# Patient Record
Sex: FEMALE | Race: WHITE | NOT HISPANIC OR LATINO | URBAN - METROPOLITAN AREA
[De-identification: names, ages, dates, MRNs, and addresses within clinical notes are randomized per-mention and may not be internally consistent; named-entity substitution may affect disease eponyms.]

---

## 2018-07-27 ENCOUNTER — NON-PROVIDER VISIT (OUTPATIENT)
Dept: HEALTH INFORMATION MANAGEMENT | Facility: MEDICAL CENTER | Age: 33
End: 2018-07-27
Payer: OTHER MISCELLANEOUS

## 2018-07-27 VITALS
HEIGHT: 63 IN | SYSTOLIC BLOOD PRESSURE: 98 MMHG | WEIGHT: 129 LBS | DIASTOLIC BLOOD PRESSURE: 58 MMHG | BODY MASS INDEX: 22.86 KG/M2

## 2018-07-27 DIAGNOSIS — O24.419 GESTATIONAL DIABETES MELLITUS (GDM) IN THIRD TRIMESTER, GESTATIONAL DIABETES METHOD OF CONTROL UNSPECIFIED: ICD-10-CM

## 2018-07-27 PROCEDURE — G0109 DIAB MANAGE TRN IND/GROUP: HCPCS | Performed by: INTERNAL MEDICINE

## 2018-07-27 NOTE — LETTER
July 27, 2018                   Re: Misti Ruiz     1985         [id number]       René Zimmerman M.D.  [address]  [address]  [place] [address]      Dear :René Zimmerman M.D.    On 7/27/2018, your patient Misti Ruiz, received 1.4 hours of diabetes education from the [facility] at [facility] for management of her gestational diabetes.  Her EDC is 10/1/18. We taught the following subjects:    Introduction to gestational diabetes, benefits and responsibilities of patient, physiology of diabetes and the diease process, benefits of blood glucose monitoring and record keeping, medication action and possible side effects, hypoglycemia, sick day management, exercise, stress reduction and travel with diabetes.       Nurse assessment / Education:    Comments:    BP:Blood Pressure: (!) 98/58   Edema: No     Weight:Weight: 58.5 kg (129 lb)         Complaints:No    Pathophysiology of diabetes in pregnancy    Discuss  potential maternal and fetal complications in pregnancy with diabetes.     Importance of blood glucose monitoring   Proper testing technique using an Accucheck Guide meter.    At 1430, the meter read 76, which was 3.5 hours after eating.  Testing: fasting and one hour after meals,  expected ranges and rationale for strict control.   Urine ketone testing and rationale    Ketone testing:  Per OB orders    Ketone test today:No       Recognition and treatment of hypoglycemia.     Insulin taught: No  Insulin briefly dicussed at this time.    Should patient require insulin later in pregnancy, she would need further education.     Reviewed fetal kick counts and other tests to determine fetal well-being  Discuss benefits and risks of exercise in pregnancy  Discuss when to call Doctor  Discuss sick day care  Importance of wearing diabetes identification    Misti attended Gestational Diabetes class with her sister, who also was translating for her as she understands some English but need help with the  rest ( Danish). Pt had a reli-on meter but was offered and chose an accucheck guide meter. Pt was taught to use, meter changed to Danish.Return demo done with finger stick of 76, 3.5 hours pp. Pt will need a prescription for Accucheck guide test strips and fastclix lancets to test four times a day sent to [facility] [address], so she can access the coupon to lower cost of strips. CDE attempted to call OB but office was closed. Pt not actively walking. Discussed what she needs to do after delivery for herself and family to limit risk for type two diabetes.    Patient/caregiver appeared to understand the content as demonstrated by appropriate questions.     Misti Ruiz was encouraged to discuss this further with you.    Hopefully this will help in your management of her care.  If we can be of further assistance, please feel free to call.    Thank you for the referral.    Sincerely,    Janki Jorgensen RN CDE  Certified Diabetes Educator

## 2018-07-27 NOTE — LETTER
July 30, 2018                   Re: Misti Ruiz     1985         [id number]       René Zimmerman M.D.  [address]  [address]  [place] [address]      Dear :René Zimmerman M.D.    On 7/30/2018, your patient Misti Ruiz, received 2.5 hours of training from the Diabetes Center at [facility] for management of her gestational diabetes.  Her EDC is Estimated Date of Delivery: None noted..  We taught the following subjects:    Patient provided 1800 calorie meal plan (30kcal/kg) with 3 meals and 3 snacks.  ~180 grams carbohydrate per day   Importance of meal planning in diabetes management during pregnancy  Importance of consistent timing of meals and snacks and agreed upon times  Avoidance of simple carbohydrates  Metabolism of food components relating to pregnancy  Identification of foods in food groups  Patient demonstrates adequate ability to utilize meal planning manual for reference  Plan 3 meals and 3 snacks with 90% accuracy  Review basic principles of eating out  Reviewed precautions with artificial sweeteners  Comments:  Misti agreed to follow the meal plan and to eat at the times agreed upon.  She will check blood glucose 4 times a day and record the values in her log book.      Misti Ruiz was encouraged to discuss this further with you.    Hopefully this will help in your management of her care.  If we can be of further assistance, please feel free to call.    Thank you for the referral.    Sincerely,    Maylin Fagan, DIXIE, LD, CDE  [phone]

## 2018-07-28 NOTE — PROGRESS NOTES
Misti attended Gestational Diabetes class with her sister, who also was translating for her as she understands some English but need help with the rest ( Russian). Pt had a reli-on meter but was offered and chose an accucheck guide meter. Pt was taught to use, meter changed to Italian. Return demo done with finger stick of 76, 3.5 hours pp. Pt will need a prescription for Accucheck guide test strips and fastclix lancets to test four times a day sent to [facility] [address], so she can access the coupon to lower cost of strips. CDE attempted to call OB but office was closed. Pt not actively walking. Discussed what she needs to do after delivery for herself and family to limit risk for type two diabetes.

## 2018-07-30 NOTE — PROGRESS NOTES
Misti received a 1800 calorie meal plan (30kcal/kg) consisting of 3 meals and 3 snacks (see media for copy of meal plan). Pt was educated on carbohydrate containing foods vs non carbohydrate containing foods, the importance of small frequent meals, limiting carbohydrate to 1 serving in the morning, no fruit before noon/12pm, and avoiding all concentrated sweets for the remainder of her pregnancy. Explained the importance of not going >4hrs btwn eating during the day and no longer than 10hours overnight. Patient agrees to follow the meal plan and guidelines provided.

## 2021-02-28 ENCOUNTER — APPOINTMENT (OUTPATIENT)
Dept: RADIOLOGY | Facility: IMAGING CENTER | Age: 36
End: 2021-02-28
Attending: NURSE PRACTITIONER
Payer: COMMERCIAL

## 2021-02-28 ENCOUNTER — OFFICE VISIT (OUTPATIENT)
Dept: URGENT CARE | Facility: PHYSICIAN GROUP | Age: 36
End: 2021-02-28
Payer: COMMERCIAL

## 2021-02-28 VITALS
HEIGHT: 62 IN | HEART RATE: 94 BPM | TEMPERATURE: 98.2 F | BODY MASS INDEX: 20.24 KG/M2 | RESPIRATION RATE: 18 BRPM | OXYGEN SATURATION: 98 % | WEIGHT: 110 LBS | DIASTOLIC BLOOD PRESSURE: 68 MMHG | SYSTOLIC BLOOD PRESSURE: 102 MMHG

## 2021-02-28 DIAGNOSIS — M79.674 TOE PAIN, RIGHT: ICD-10-CM

## 2021-02-28 DIAGNOSIS — S92.514A CLOSED NONDISPLACED FRACTURE OF PROXIMAL PHALANX OF LESSER TOE OF RIGHT FOOT, INITIAL ENCOUNTER: ICD-10-CM

## 2021-02-28 DIAGNOSIS — S99.921A INJURY OF RIGHT TOE, INITIAL ENCOUNTER: ICD-10-CM

## 2021-02-28 PROCEDURE — 73660 X-RAY EXAM OF TOE(S): CPT | Mod: TC,RT | Performed by: NURSE PRACTITIONER

## 2021-02-28 PROCEDURE — 99204 OFFICE O/P NEW MOD 45 MIN: CPT | Performed by: NURSE PRACTITIONER

## 2021-02-28 RX ORDER — IBUPROFEN 800 MG/1
800 TABLET ORAL EVERY 8 HOURS PRN
Qty: 30 TABLET | Refills: 0 | Status: SHIPPED | OUTPATIENT
Start: 2021-02-28

## 2021-02-28 RX ORDER — IBUPROFEN 200 MG
200 TABLET ORAL EVERY 6 HOURS PRN
COMMUNITY

## 2021-02-28 ASSESSMENT — ENCOUNTER SYMPTOMS
NEUROLOGICAL NEGATIVE: 1
TINGLING: 0
CONSTITUTIONAL NEGATIVE: 1
SENSORY CHANGE: 0
WEAKNESS: 0

## 2021-02-28 ASSESSMENT — VISUAL ACUITY: OU: 1

## 2021-02-28 NOTE — PROGRESS NOTES
"Subjective:     Misti Ruiz is a 35 y.o. female who presents for Toe Injury (R fourth  toe injury , painful, swelling, x2 days )       Toe Injury  This is a new problem. Pertinent negatives include no rash or weakness. She has tried NSAIDs for the symptoms.     Patient accompanied by spouse. History collected from spouse.    Reports that patient stubbed her right 4th toe Friday night. Patient reporting pain, tenderness, and swelling at the base of her right 4th toe.    Patient was screened prior to rooming and denied COVID-19 diagnosis or contact with a person who has been diagnosed or is suspected to have COVID-19. During this visit, appropriate PPE was worn, hand hygiene was performed, and the patient and any visitors were masked.     PMH:  has no past medical history on file.    MEDS:   Current Outpatient Medications:   •  ibuprofen (MOTRIN) 200 MG Tab, Take 200 mg by mouth every 6 hours as needed., Disp: , Rfl:   •  ibuprofen (MOTRIN) 800 MG Tab, Take 1 tablet by mouth every 8 hours as needed for Moderate Pain, Fever or Inflammation., Disp: 30 tablet, Rfl: 0    ALLERGIES: No Known Allergies    SURGHX: History reviewed. No pertinent surgical history.    SOCHX:  reports that she has never smoked. She has never used smokeless tobacco. She reports previous alcohol use. She reports that she does not use drugs.     FH: Reviewed with patient, not pertinent to this visit.    Review of Systems   Constitutional: Negative.    Musculoskeletal:        R 4th toe injury, pain, tenderness   Skin: Negative.  Negative for rash.   Neurological: Negative.  Negative for tingling, sensory change and weakness.   All other systems reviewed and are negative.    Additional details per HPI.      Objective:     /68 (BP Location: Right arm, Patient Position: Sitting, BP Cuff Size: Adult)   Pulse 94   Temp 36.8 °C (98.2 °F) (Temporal)   Resp 18   Ht 1.575 m (5' 2\")   Wt 49.9 kg (110 lb)   SpO2 98%   BMI 20.12 kg/m²     Physical " Exam  Vitals reviewed.   Constitutional:       General: She is not in acute distress.     Appearance: She is well-developed. She is not ill-appearing or toxic-appearing.   HENT:      Head: Normocephalic.      Right Ear: External ear normal.      Left Ear: External ear normal.   Eyes:      General: Vision grossly intact.      Extraocular Movements: Extraocular movements intact.      Conjunctiva/sclera: Conjunctivae normal.   Cardiovascular:      Rate and Rhythm: Normal rate.      Pulses: Normal pulses.   Pulmonary:      Effort: Pulmonary effort is normal. No respiratory distress.   Musculoskeletal:         General: No deformity. Normal range of motion.      Cervical back: Normal range of motion.      Right foot: Normal range of motion and normal capillary refill. Swelling and tenderness present. No deformity or laceration.        Feet:    Skin:     General: Skin is warm and dry.      Capillary Refill: Capillary refill takes less than 2 seconds.      Coloration: Skin is not pale.      Findings: No rash.   Neurological:      Mental Status: She is alert and oriented to person, place, and time.      Sensory: No sensory deficit.      Motor: No weakness.      Coordination: Coordination normal.   Psychiatric:         Behavior: Behavior normal. Behavior is cooperative.     X-ray of right toes:    Details    Reading Physician Reading Date Result Priority   Briana Garg M.D.  [phone] 2/28/2021 Ur[facility]      Narrative & Impression        2/28/2021 3:25 PM     HISTORY/REASON FOR EXAM:  Right 4th toe pain and swelling after stubbing injury 2 days ago..        TECHNIQUE/EXAM DESCRIPTION AND NUMBER OF VIEWS:  3 views of the RIGHT toes.     COMPARISON: None     FINDINGS:  There is an oblique nondisplaced fracture of the right 4th proximal phalanx. There is no intra-articular involvement.     Remainder of the right foot is unremarkable.     IMPRESSION:     1.  There is a nondisplaced oblique right 4th proximal phalanx  fracture.             Last Resulted: 02/28/21  4:02 PM        Radiology report and images reviewed by myself. Concur with findings.      Assessment/Plan:     1. Closed nondisplaced fracture of proximal phalanx of lesser toe of right foot, initial encounter  - REFERRAL TO PODIATRY    2. Injury of right toe, initial encounter  - DX-TOE(S) 2+ RIGHT; Future  - REFERRAL TO PODIATRY    3. Toe pain, right  - ibuprofen (MOTRIN) 800 MG Tab; Take 1 tablet by mouth every 8 hours as needed for Moderate Pain, Fever or Inflammation.  Dispense: 30 tablet; Refill: 0    R 3rd and 4th toes buddy taped together. Short walking boot with rigid sole applied.    Rest, ice, compression, and elevation (RICE) and over-the-counter ibuprofen and/or acetaminophen, per 's instructions, as needed for pain.    Urgent referral placed for podiatry evaluation and treatment.    Differential diagnosis, natural history, supportive care, over-the-counter symptom management per 's instructions, close monitoring, and indications for immediate follow-up discussed.     Patient and spouse advised to: Return for 1) Symptoms that worsen/don't improve, or go to ER, 2) Follow up with primary care in 7-10 days.    All questions answered. Patient and spouse agree with the plan of care.    Discharge summary provided.

## 2021-02-28 NOTE — PATIENT INSTRUCTIONS
Toe Fracture  A toe fracture is a break in one of the toe bones (phalanges). A toe fracture may be:  · A crack in the surface of the bone (stress fracture). This often occurs in athletes.  · A break all the way through the bone (complete fracture).  What are the causes?  This condition may be caused by:  · Direct impact, such as from dropping a heavy object on your toe.  · Stubbing your toe.  · Twisting or stretching your toe out of place.  · Overuse or repetitive exercise.  What increases the risk?  You are more likely to develop this condition if you:  · Play contact sports.  · Have a condition that causes the bones to become thin and brittle (osteoporosis).  · Have a low calcium level.  What are the signs or symptoms?  The main symptoms of this condition are swelling and pain in the toe. Other symptoms include:  · Bruising.  · Stiffness.  · Numbness.  · A change in the way the toe looks.  · Broken bones that poke through the skin.  · Blood beneath the toenail.  How is this diagnosed?  This condition is diagnosed with a physical exam. You may also have X-rays.  How is this treated?  Treatment for this condition depends on the type of fracture and its severity. Treatment may include:  · Taping the broken toe to a toe that is next to it (vangie taping). This is the most common treatment for fractures in which the bone has not moved out of place (non-displaced fracture).  · Wearing a shoe that has a wide, rigid sole to protect the toe and to limit its movement.  · Wearing a walking cast.  · Having a procedure to move the toe back into place.  · Surgery. This may be needed if the:  ? Pieces of broken bone are out of place (displaced).  ? Bone breaks through the skin.  · Physical therapy. This is done to help regain movement and strength in the toe.  You may need follow-up X-rays to make sure that the bone is healing well and staying in position.  Follow these instructions at home:  If you have a shoe:  · Wear the shoe  as told by your health care provider. Remove it only as told by your health care provider.  · Loosen the shoe if your toes tingle, become numb, or turn cold and blue.  · Keep the shoe clean and dry.  If you have a cast:  · Do not put pressure on any part of the cast until it is fully hardened. This may take several hours.  · Do not stick anything inside the cast to scratch your skin. Doing that increases your risk of infection.  · Check the skin around the cast every day. Tell your health care provider about any concerns.  · You may put lotion on dry skin around the edges of the cast. Do not put lotion on the skin underneath the cast.  · Keep the cast clean and dry.  Bathing  · Do not take baths, swim, or use a hot tub until your health care provider approves. Ask your health care provider if you can take showers.  · If the shoe or cast is not waterproof:  ? Do not let it get wet.  ? Cover it with a watertight covering when you take a bath or a shower.  Activity  · Do not use the injured foot to support your body weight until your health care provider says that you can. Use crutches as directed.  · Ask your health care provider:  ? What activities are safe for you during recovery.  ? What activities you need to avoid.  · Do physical therapy exercises as directed.  Driving  · Do not drive or use heavy machinery while taking pain medicine.  · Do not drive while wearing a cast on a foot that you use for driving.  Managing pain, stiffness, and swelling    · If directed, put ice on painful areas:  ? Put ice in a plastic bag.  ? Place a towel between your skin and the bag.  § If you have a shoe, remove it as told by your health care provider.  § If you have a cast, place a towel between your cast and the bag.  ? Leave the ice on for 20 minutes, 2-3 times per day.  · Raise (elevate) the injured area above the level of your heart while you are sitting or lying down.  General instructions  · If your toe was treated with  vangie taping, follow your health care provider's instructions for changing the gauze and tape. Change it more often if:  ? The gauze and tape get wet. If this happens, dry the space between the toes.  ? The gauze and tape are too tight and cause your toe to become pale or numb.  · If you were not given a protective shoe, wear sturdy, supportive shoes. Your shoes should not pinch your toes and should not fit tightly against your toes.  · Do not use any products that contain nicotine or tobacco, such as cigarettes and e-cigarettes. These can delay bone healing. If you need help quitting, ask your health care provider.  · Take over-the-counter and prescription medicines only as told by your health care provider.  · Keep all follow-up visits as told by your health care provider. This is important.  Contact a health care provider if you have:  · Pain that gets worse or does not get better with medicine.  · A fever.  · A bad smell coming from your cast.  Get help right away if you have:  · Any of the following in your toes or your foot:  ? Numbness that gets worse.  ? Tingling.  ? Coldness.  ? Blue skin.  · Redness or swelling that gets worse.  · Pain that suddenly becomes severe.  Summary  · A toe fracture is a break in one of the toe bones (phalanges).  · Treatment depends on how severe your fracture is and how the pieces of the broken bone line up with each other. Treatment may include vangie taping, wearing a shoe or a cast, or using crutches.  · Ice and elevate your foot to help lessen the pain and swelling.  This information is not intended to replace advice given to you by your health care provider. Make sure you discuss any questions you have with your health care provider.  Document Released: 12/15/2001 Document Revised: 04/02/2019 Document Reviewed: 01/29/2019  Elsevier Patient Education © 2020 [employer]